# Patient Record
(demographics unavailable — no encounter records)

---

## 2025-04-30 NOTE — HISTORY OF PRESENT ILLNESS
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 He states 2 episodes of I&D in [place], and was told he would need a surgical procedure at some point. He presents today with spontaneous drainage.

## 2025-04-30 NOTE — PHYSICAL EXAM
[Respiratory Effort] : normal respiratory effort [Alert] : alert [Oriented to Person] : oriented to person [Oriented to Place] : oriented to place [Oriented to Time] : oriented to time [Calm] : calm [JVD] : no jugular venous distention  [de-identified] : KATEY MARCELO NAD [de-identified] : EOMI [de-identified] : + pilonidal cyst  with drainage.

## 2025-06-02 NOTE — ASSESSMENT
[FreeTextEntry1] : Patient is doing well, no complaints.    wound is looking almost closed, no packing seen.   wound was opened with finger tip and cleaned with 4x4 area was shaved and repacked.   explained to patient the importance of daily packing to prevent recurrence.   f/u 1 week